# Patient Record
Sex: FEMALE | Race: WHITE | NOT HISPANIC OR LATINO | Employment: FULL TIME | URBAN - METROPOLITAN AREA
[De-identification: names, ages, dates, MRNs, and addresses within clinical notes are randomized per-mention and may not be internally consistent; named-entity substitution may affect disease eponyms.]

---

## 2024-02-02 ENCOUNTER — TELEPHONE (OUTPATIENT)
Dept: PRIMARY CARE | Facility: CLINIC | Age: 59
End: 2024-02-02
Payer: COMMERCIAL

## 2024-02-02 NOTE — TELEPHONE ENCOUNTER
Pt called and said she was in the ER recently with a severe UTI, while there they noticed her WBC count was very elevated and they want Dr Ruby to monitor this, she mentioned she is on bactrum now

## 2024-02-13 ENCOUNTER — TELEPHONE (OUTPATIENT)
Dept: PRIMARY CARE | Facility: CLINIC | Age: 59
End: 2024-02-13
Payer: COMMERCIAL

## 2024-02-13 NOTE — TELEPHONE ENCOUNTER
Pt left message that she was recently in the hospital and prescribed antibiotic for UTI.  States antibiotic is causing GI issues and would like to know if Activia is recommended?

## 2024-02-14 PROBLEM — K52.9 GASTROENTERITIS: Status: ACTIVE | Noted: 2024-02-14

## 2024-02-14 RX ORDER — CETIRIZINE HYDROCHLORIDE, PSEUDOEPHEDRINE HYDROCHLORIDE 5; 120 MG/1; MG/1
1 TABLET, FILM COATED, EXTENDED RELEASE ORAL 2 TIMES DAILY PRN
COMMUNITY
Start: 2019-06-16

## 2024-02-14 RX ORDER — ONDANSETRON 4 MG/1
4 TABLET, ORALLY DISINTEGRATING ORAL EVERY 8 HOURS PRN
COMMUNITY
Start: 2022-10-03 | End: 2024-02-15 | Stop reason: WASHOUT

## 2024-02-15 ENCOUNTER — OFFICE VISIT (OUTPATIENT)
Dept: PRIMARY CARE | Facility: CLINIC | Age: 59
End: 2024-02-15
Payer: COMMERCIAL

## 2024-02-15 ENCOUNTER — LAB (OUTPATIENT)
Dept: LAB | Facility: LAB | Age: 59
End: 2024-02-15
Payer: COMMERCIAL

## 2024-02-15 VITALS
OXYGEN SATURATION: 99 % | HEART RATE: 77 BPM | BODY MASS INDEX: 20.17 KG/M2 | SYSTOLIC BLOOD PRESSURE: 150 MMHG | WEIGHT: 109.6 LBS | TEMPERATURE: 98.7 F | HEIGHT: 62 IN | DIASTOLIC BLOOD PRESSURE: 82 MMHG

## 2024-02-15 DIAGNOSIS — N20.0 RENAL CALCULI: ICD-10-CM

## 2024-02-15 DIAGNOSIS — R39.9 URINARY TRACT INFECTION SYMPTOMS: Primary | ICD-10-CM

## 2024-02-15 DIAGNOSIS — R39.9 URINARY TRACT INFECTION SYMPTOMS: ICD-10-CM

## 2024-02-15 DIAGNOSIS — R94.5 ABNORMAL LIVER FUNCTION: ICD-10-CM

## 2024-02-15 DIAGNOSIS — R74.8 ALKALINE PHOSPHATASE ELEVATION: Primary | ICD-10-CM

## 2024-02-15 LAB
ALBUMIN SERPL BCP-MCNC: 4.7 G/DL (ref 3.4–5)
ALP SERPL-CCNC: 158 U/L (ref 33–136)
ALT SERPL W P-5'-P-CCNC: 29 U/L (ref 7–45)
ANION GAP SERPL CALC-SCNC: 14 MMOL/L (ref 10–20)
APPEARANCE UR: CLEAR
AST SERPL W P-5'-P-CCNC: 23 U/L (ref 9–39)
BACTERIA #/AREA URNS AUTO: ABNORMAL /HPF
BASOPHILS # BLD AUTO: 0.05 X10*3/UL (ref 0–0.1)
BASOPHILS NFR BLD AUTO: 0.7 %
BILIRUB SERPL-MCNC: 0.7 MG/DL (ref 0–1.2)
BILIRUB UR STRIP.AUTO-MCNC: NEGATIVE MG/DL
BUN SERPL-MCNC: 13 MG/DL (ref 6–23)
CALCIUM SERPL-MCNC: 10.4 MG/DL (ref 8.6–10.3)
CHLORIDE SERPL-SCNC: 101 MMOL/L (ref 98–107)
CO2 SERPL-SCNC: 28 MMOL/L (ref 21–32)
COLOR UR: YELLOW
CREAT SERPL-MCNC: 0.66 MG/DL (ref 0.5–1.05)
EGFRCR SERPLBLD CKD-EPI 2021: >90 ML/MIN/1.73M*2
EOSINOPHIL # BLD AUTO: 0.06 X10*3/UL (ref 0–0.7)
EOSINOPHIL NFR BLD AUTO: 0.8 %
ERYTHROCYTE [DISTWIDTH] IN BLOOD BY AUTOMATED COUNT: 13.1 % (ref 11.5–14.5)
GLUCOSE SERPL-MCNC: 89 MG/DL (ref 74–99)
GLUCOSE UR STRIP.AUTO-MCNC: NEGATIVE MG/DL
HCT VFR BLD AUTO: 44.4 % (ref 36–46)
HGB BLD-MCNC: 14 G/DL (ref 12–16)
IMM GRANULOCYTES # BLD AUTO: 0.04 X10*3/UL (ref 0–0.7)
IMM GRANULOCYTES NFR BLD AUTO: 0.5 % (ref 0–0.9)
KETONES UR STRIP.AUTO-MCNC: NEGATIVE MG/DL
LEUKOCYTE ESTERASE UR QL STRIP.AUTO: ABNORMAL
LYMPHOCYTES # BLD AUTO: 1.58 X10*3/UL (ref 1.2–4.8)
LYMPHOCYTES NFR BLD AUTO: 21.3 %
MCH RBC QN AUTO: 28.7 PG (ref 26–34)
MCHC RBC AUTO-ENTMCNC: 31.5 G/DL (ref 32–36)
MCV RBC AUTO: 91 FL (ref 80–100)
MONOCYTES # BLD AUTO: 0.81 X10*3/UL (ref 0.1–1)
MONOCYTES NFR BLD AUTO: 10.9 %
MUCOUS THREADS #/AREA URNS AUTO: ABNORMAL /LPF
NEUTROPHILS # BLD AUTO: 4.89 X10*3/UL (ref 1.2–7.7)
NEUTROPHILS NFR BLD AUTO: 65.8 %
NITRITE UR QL STRIP.AUTO: NEGATIVE
NRBC BLD-RTO: 0 /100 WBCS (ref 0–0)
PH UR STRIP.AUTO: 6 [PH]
PLATELET # BLD AUTO: 500 X10*3/UL (ref 150–450)
POTASSIUM SERPL-SCNC: 4.8 MMOL/L (ref 3.5–5.3)
PROT SERPL-MCNC: 8.1 G/DL (ref 6.4–8.2)
PROT UR STRIP.AUTO-MCNC: NEGATIVE MG/DL
RBC # BLD AUTO: 4.88 X10*6/UL (ref 4–5.2)
RBC # UR STRIP.AUTO: ABNORMAL /UL
RBC #/AREA URNS AUTO: ABNORMAL /HPF
SODIUM SERPL-SCNC: 138 MMOL/L (ref 136–145)
SP GR UR STRIP.AUTO: 1.02
UROBILINOGEN UR STRIP.AUTO-MCNC: <2 MG/DL
WBC # BLD AUTO: 7.4 X10*3/UL (ref 4.4–11.3)
WBC #/AREA URNS AUTO: ABNORMAL /HPF

## 2024-02-15 PROCEDURE — 80053 COMPREHEN METABOLIC PANEL: CPT

## 2024-02-15 PROCEDURE — 85025 COMPLETE CBC W/AUTO DIFF WBC: CPT

## 2024-02-15 PROCEDURE — 84080 ASSAY ALKALINE PHOSPHATASES: CPT

## 2024-02-15 PROCEDURE — 1036F TOBACCO NON-USER: CPT | Performed by: INTERNAL MEDICINE

## 2024-02-15 PROCEDURE — 81001 URINALYSIS AUTO W/SCOPE: CPT

## 2024-02-15 PROCEDURE — 36415 COLL VENOUS BLD VENIPUNCTURE: CPT

## 2024-02-15 PROCEDURE — 87086 URINE CULTURE/COLONY COUNT: CPT

## 2024-02-15 PROCEDURE — 99214 OFFICE O/P EST MOD 30 MIN: CPT | Performed by: INTERNAL MEDICINE

## 2024-02-15 RX ORDER — NITROFURANTOIN MACROCRYSTALS 50 MG/1
50 CAPSULE ORAL NIGHTLY
COMMUNITY
Start: 2024-02-13 | End: 2024-04-22 | Stop reason: WASHOUT

## 2024-02-15 ASSESSMENT — PATIENT HEALTH QUESTIONNAIRE - PHQ9
2. FEELING DOWN, DEPRESSED OR HOPELESS: NOT AT ALL
SUM OF ALL RESPONSES TO PHQ9 QUESTIONS 1 & 2: 0
1. LITTLE INTEREST OR PLEASURE IN DOING THINGS: NOT AT ALL

## 2024-02-15 ASSESSMENT — PAIN SCALES - GENERAL: PAINLEVEL: 0-NO PAIN

## 2024-02-15 NOTE — PROGRESS NOTES
"Standard Progress Note  Chief Complaint   Patient presents with    Hospital Follow-up     Pt of Dr. Ruby here for FUV from discharge from [facility] on 2/5/24.     Accompanied by her  Mikey  HPI:  Ilia Hu 59 y.o.   female.  Patient of Dr. Ruby.  Presents for evaluation post hospitalization.  She states that she is typically in good health.  In December she had a sinus infection and a urinary tract infection.  January 30 she states that she was evaluated by a physician assistant for dysuria but workup was negative.  However January 31, 2023 she had shaking she went to [facility] emergency room treated with Bactrim.  However she states that she developed abnormal liver enzymes.  Patient decayed she was told that this was from the Bactrim.  On February 4 she was called to come into the hospital because she had positive blood cultures.  She was evaluated by an infectious disease doctor.  She thinks it may have been Dr. Polo but not she is not sure the doctor's name.  Patient indicates she was told that the positive blood culture was not really a blood infection.  She was sent home on Keflex 500 mg every 6 hours for 5 days.  Patient indicates she saw the physician assistant on February 13 and testing in the office was negative.  However it sounds like either a dipstick or UA was done but not a culture.  Of note she does have 2 kidney stones as well.    Patient states that she does not feel well.  She describes that her tongue was prickly but when she took the Macrodantin last night that sensation resolved.  She has a burning sensation in the rectal area.  She described it as an acid.  She did feel like she was sweating last night but denies fever.  She feels weak and in general feels unwell.    She recently changed jobs and is working at [employer].  She states aggravating her hard time.          Blood pressure 150/82, pulse 77, temperature 37.1 °C (98.7 °F), height 1.575 m (5' 2\"), weight 49.7 kg (109 lb 9.6 oz), " SpO2 99 %.  Body mass index is 20.05 kg/m².  Appears tired.  She does not appear in acute distress.  Vital reviewed  Neck: no cervical/clavicular adenopathy  CV: RRR S1 S2 normal. No murmur. No carotid bruit.   Lungs: CTA without wrr. Breath sounds symmetric  Abdomen: normoactive. Soft, nontender.  Nontender to deep palpation including suprapubic.  No CVA tenderness no mass.  Extremities: no pretibial edema  Neuro: speech intact.     Reviewed x-ray 5 mm stone right kidney.  3 mm left kidney.  This was done by urologist    1. Urinary tract infection symptoms  She continues to feel unwell.  Recommend obtain UA including culture.  Also recheck CBC and CMP.  Advised her to continue nitrofurantoin for prophylaxis.  Advised her to remain off work until seen by her regular doctor next week.  Discussed with staff at  and patient provided a letter.  - Urine Culture; Future  - Urinalysis with Reflex Microscopic; Future    2. Renal calculi  She is seeing urologist.    3. Abnormal liver function  Unclear etiology recommend repeat as should improve or resolve if was just reaction to her acute illness or medication  - Comprehensive Metabolic Panel; Future  - CBC and Auto Differential; Future      Current Outpatient Medications on File Prior to Visit   Medication Sig Dispense Refill    cetirizine-pseudoephedrine (ZyrTEC-D) 5-120 mg 12 hr tablet Take 1 tablet by mouth 2 times a day as needed.      nitrofurantoin (Macrodantin) 50 mg capsule Take 1 capsule (50 mg) by mouth once daily at bedtime.      [DISCONTINUED] ondansetron ODT (Zofran-ODT) 4 mg disintegrating tablet Take 1 tablet (4 mg) by mouth every 8 hours if needed.       No current facility-administered medications on file prior to visit.         Nelida Bach MD

## 2024-02-15 NOTE — LETTER
February 15, 2024     Patient: Ilia Hu   YOB: 1965   Date of Visit: 2/15/2024       To Whom It May Concern:    Ilia Hu was seen in my clinic on 2/15/2024 at 11:30 am. Please excuse Ilia for her absence from work on this day to make the appointment. She was advised to remain off work until she sees her regular doctor, Dr Ruby. She will see him next week. Appointment is being scheduled.     If you have any questions or concerns, please don't hesitate to call.         Sincerely,         Nelida Bach MD        CC: No Recipients

## 2024-02-16 ENCOUNTER — TELEPHONE (OUTPATIENT)
Dept: PRIMARY CARE | Facility: CLINIC | Age: 59
End: 2024-02-16
Payer: COMMERCIAL

## 2024-02-16 LAB — BACTERIA UR CULT: NORMAL

## 2024-02-16 NOTE — TELEPHONE ENCOUNTER
"Pt left message \"I saw my results online and saw that my platelets are elevated.  I am concerned about this and would like to know if someone can call me and explain this to me?\"  "

## 2024-02-16 NOTE — TELEPHONE ENCOUNTER
----- Message from Nelida Hope MD sent at 2/15/2024  6:13 PM EST -----  Please contact [facility] and obtain records from patient's recent admission including discharge summary evaluation by ID doctor  And any imaging done

## 2024-02-20 ENCOUNTER — OFFICE VISIT (OUTPATIENT)
Dept: PRIMARY CARE | Facility: CLINIC | Age: 59
End: 2024-02-20
Payer: COMMERCIAL

## 2024-02-20 VITALS
SYSTOLIC BLOOD PRESSURE: 132 MMHG | HEIGHT: 62 IN | WEIGHT: 109 LBS | OXYGEN SATURATION: 100 % | TEMPERATURE: 98.5 F | BODY MASS INDEX: 20.06 KG/M2 | DIASTOLIC BLOOD PRESSURE: 88 MMHG | HEART RATE: 89 BPM

## 2024-02-20 DIAGNOSIS — K52.9 GASTROENTERITIS: ICD-10-CM

## 2024-02-20 DIAGNOSIS — R11.0 NAUSEA: Primary | ICD-10-CM

## 2024-02-20 LAB
ALP BONE SERPL-CCNC: 50 U/L (ref 0–55)
ALP LIVER SERPL-CCNC: 137 U/L (ref 0–94)
ALP OTHER SERPL-CCNC: 0 U/L
ALP SERPL-CCNC: 187 U/L (ref 40–120)

## 2024-02-20 PROCEDURE — 1036F TOBACCO NON-USER: CPT | Performed by: FAMILY MEDICINE

## 2024-02-20 PROCEDURE — 99213 OFFICE O/P EST LOW 20 MIN: CPT | Performed by: FAMILY MEDICINE

## 2024-02-20 RX ORDER — ONDANSETRON 4 MG/1
4 TABLET, ORALLY DISINTEGRATING ORAL EVERY 8 HOURS PRN
Qty: 30 TABLET | Refills: 1 | Status: SHIPPED | OUTPATIENT
Start: 2024-02-20 | End: 2024-03-11

## 2024-02-20 RX ORDER — HYOSCYAMINE SULFATE 0.12 MG/1
0.12 TABLET, ORALLY DISINTEGRATING ORAL EVERY 4 HOURS PRN
Qty: 120 EACH | Refills: 0 | Status: SHIPPED | OUTPATIENT
Start: 2024-02-20 | End: 2024-04-22 | Stop reason: WASHOUT

## 2024-02-20 RX ORDER — FAMOTIDINE 40 MG/1
40 TABLET, FILM COATED ORAL 2 TIMES DAILY
Qty: 60 TABLET | Refills: 0 | Status: SHIPPED | OUTPATIENT
Start: 2024-02-20 | End: 2024-04-22 | Stop reason: SDUPTHER

## 2024-02-20 NOTE — LETTER
February 20, 2024     Patient: Ilia Hu   YOB: 1965   Date of Visit: 2/20/2024       To Whom It May Concern:    Ilia Hu was seen in my clinic on 2/20/2024 at 11:15 am. Please excuse Ilia for her absence from work on this day to make the appointment. Due to medical illness and recent hospitalization can return 2/26/2024    If you have any questions or concerns, please don't hesitate to call.         Sincerely,         Brody Ruby, DO        CC: No Recipients

## 2024-02-20 NOTE — PROGRESS NOTES
Patient is here for follow-up where she had been in the hospital for a possible UTI with possible sepsis.  Infectious disease saw her and thought that she had a false positive blood culture.  She is now been on Macrobid use not felt back to herself the main thing is is that she has felt kind of bloated and still fatigued.  She did have 1 good stool today.  She had a reaction to sulfa drugs when she was in the hospital and forgot she was allergic to them.  She is also been kind of nauseated and that does not feel good.  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  Has been drinking water and been peeing more because of that she is not having the same symptoms with the urine issues and did follow-up with the PA for urology.    REVIEW OF SYSTEMS: 12 systems negative except for those mentioned in the HPI.    PHYSICAL EXAMINATION:   Constitutional: The patient is alert, in no acute  distress.  Patient does look a little down  Eyes: Extraocular movements are intact. Pupils are equal and reactive to light  ENT: TMs are clear  Neck: Supple without lymphadenopathy or JVD.  Heart: Regular rate and rhythm without murmur, click, gallop, or rub.  Lungs: Clear to auscultation bilaterally. No rales, rhonchi, or  wheezing.  Psychiatric: Good judgment and insight. Normal affect and mood.  Lymphatic: as noted above.  Skin: no rashes or lesions  Abdomen: Soft, distended, normal bowel sounds.  No pain with palpation or rebound      Assessment:  per EMR    Plan:  I did review the urine culture as well as my partner in the office visit from 5 days ago.  She is on a low-dose of Macrobid nightly.  She does feel like after she takes that in the morning she feels little bit better.  Did recommend following with the urologist.  Patient is quietly distended and likely because of the issues with the  antibiotics as well as the urine issues I believe is having some acid reflux as well as some substantial bloating causing a lot of her symptoms.  Patient also may have some acute adjustment depression she does not seem satisfied with her job nor does she want to go back to it because it sounds like they have had a hard time with her medical illnesses here recently.  If patient has a fever would definitely send back for urine culture blood culture and full blood panels.  Would expected to be rapid resolution within the next day or 2 with the abdomen as well as also discussed the patient needs to get up and actually walk and that it is not uncommon to be fatigued for a few days after but this seems to be a little bit extended    This dictation was created using Dragon dictation and may contain errors

## 2024-02-20 NOTE — PROGRESS NOTES
Fuv debby Hope   Pt said kidney stones again, nausea, not feeling good, soft stool, tongue and throat feels scratchy - x end of January   Urine came back negative the other day   Wants a nausea med   Work letter

## 2024-02-21 ENCOUNTER — TELEPHONE (OUTPATIENT)
Dept: PRIMARY CARE | Facility: CLINIC | Age: 59
End: 2024-02-21
Payer: COMMERCIAL

## 2024-02-21 DIAGNOSIS — R74.8 ELEVATED LIVER ENZYMES: Primary | ICD-10-CM

## 2024-02-21 DIAGNOSIS — K52.9 GASTROENTERITIS: ICD-10-CM

## 2024-02-22 ENCOUNTER — LAB (OUTPATIENT)
Dept: LAB | Facility: LAB | Age: 59
End: 2024-02-22
Payer: COMMERCIAL

## 2024-02-22 DIAGNOSIS — R74.8 ELEVATED LIVER ENZYMES: ICD-10-CM

## 2024-02-22 DIAGNOSIS — K52.9 GASTROENTERITIS: ICD-10-CM

## 2024-02-22 LAB
ALBUMIN SERPL BCP-MCNC: 4.5 G/DL (ref 3.4–5)
ALP SERPL-CCNC: 112 U/L (ref 33–136)
ALT SERPL W P-5'-P-CCNC: 19 U/L (ref 7–45)
AMYLASE SERPL-CCNC: 68 U/L (ref 29–103)
ANION GAP SERPL CALC-SCNC: 13 MMOL/L (ref 10–20)
AST SERPL W P-5'-P-CCNC: 18 U/L (ref 9–39)
BASOPHILS # BLD AUTO: 0.04 X10*3/UL (ref 0–0.1)
BASOPHILS NFR BLD AUTO: 0.5 %
BILIRUB SERPL-MCNC: 0.6 MG/DL (ref 0–1.2)
BUN SERPL-MCNC: 17 MG/DL (ref 6–23)
CALCIUM SERPL-MCNC: 10 MG/DL (ref 8.6–10.3)
CHLORIDE SERPL-SCNC: 99 MMOL/L (ref 98–107)
CMV IGG AVIDITY SERPL IA-RTO: NONREACTIVE %
CO2 SERPL-SCNC: 30 MMOL/L (ref 21–32)
CREAT SERPL-MCNC: 0.92 MG/DL (ref 0.5–1.05)
EBV EA IGG SER QL: POSITIVE
EBV NA AB SER QL: POSITIVE
EBV VCA IGG SER IA-ACNC: POSITIVE
EBV VCA IGM SER IA-ACNC: ABNORMAL
EGFRCR SERPLBLD CKD-EPI 2021: 71 ML/MIN/1.73M*2
EOSINOPHIL # BLD AUTO: 0.06 X10*3/UL (ref 0–0.7)
EOSINOPHIL NFR BLD AUTO: 0.8 %
ERYTHROCYTE [DISTWIDTH] IN BLOOD BY AUTOMATED COUNT: 13.3 % (ref 11.5–14.5)
GLUCOSE SERPL-MCNC: 100 MG/DL (ref 74–99)
HAV IGM SER QL: NONREACTIVE
HBV CORE IGM SER QL: NONREACTIVE
HBV SURFACE AG SERPL QL IA: NONREACTIVE
HCT VFR BLD AUTO: 42.5 % (ref 36–46)
HCV AB SER QL: NONREACTIVE
HGB BLD-MCNC: 13.8 G/DL (ref 12–16)
IMM GRANULOCYTES # BLD AUTO: 0.04 X10*3/UL (ref 0–0.7)
IMM GRANULOCYTES NFR BLD AUTO: 0.5 % (ref 0–0.9)
LIPASE SERPL-CCNC: 46 U/L (ref 9–82)
LYMPHOCYTES # BLD AUTO: 1.79 X10*3/UL (ref 1.2–4.8)
LYMPHOCYTES NFR BLD AUTO: 23 %
MCH RBC QN AUTO: 29.6 PG (ref 26–34)
MCHC RBC AUTO-ENTMCNC: 32.5 G/DL (ref 32–36)
MCV RBC AUTO: 91 FL (ref 80–100)
MONOCYTES # BLD AUTO: 0.82 X10*3/UL (ref 0.1–1)
MONOCYTES NFR BLD AUTO: 10.5 %
NEUTROPHILS # BLD AUTO: 5.04 X10*3/UL (ref 1.2–7.7)
NEUTROPHILS NFR BLD AUTO: 64.7 %
NRBC BLD-RTO: 0 /100 WBCS (ref 0–0)
PLATELET # BLD AUTO: 403 X10*3/UL (ref 150–450)
POTASSIUM SERPL-SCNC: 4.6 MMOL/L (ref 3.5–5.3)
PROT SERPL-MCNC: 7.2 G/DL (ref 6.4–8.2)
RBC # BLD AUTO: 4.66 X10*6/UL (ref 4–5.2)
SODIUM SERPL-SCNC: 137 MMOL/L (ref 136–145)
WBC # BLD AUTO: 7.8 X10*3/UL (ref 4.4–11.3)

## 2024-02-22 PROCEDURE — 36415 COLL VENOUS BLD VENIPUNCTURE: CPT

## 2024-02-22 PROCEDURE — 85025 COMPLETE CBC W/AUTO DIFF WBC: CPT

## 2024-02-22 PROCEDURE — 86665 EPSTEIN-BARR CAPSID VCA: CPT

## 2024-02-22 PROCEDURE — 86644 CMV ANTIBODY: CPT

## 2024-02-22 PROCEDURE — 86664 EPSTEIN-BARR NUCLEAR ANTIGEN: CPT

## 2024-02-22 PROCEDURE — 83690 ASSAY OF LIPASE: CPT

## 2024-02-22 PROCEDURE — 80053 COMPREHEN METABOLIC PANEL: CPT

## 2024-02-22 PROCEDURE — 86645 CMV ANTIBODY IGM: CPT

## 2024-02-22 PROCEDURE — 80074 ACUTE HEPATITIS PANEL: CPT

## 2024-02-22 PROCEDURE — 86663 EPSTEIN-BARR ANTIBODY: CPT

## 2024-02-22 PROCEDURE — 82150 ASSAY OF AMYLASE: CPT

## 2024-02-24 LAB
CMV IGM SERPL-ACNC: <8 AU/ML
EBV VCA IGM SER-ACNC: <10 U/ML (ref 0–43.9)

## 2024-03-15 ENCOUNTER — TELEPHONE (OUTPATIENT)
Dept: PRIMARY CARE | Facility: CLINIC | Age: 59
End: 2024-03-15
Payer: COMMERCIAL

## 2024-03-18 DIAGNOSIS — K14.6 TONGUE BURNING SENSATION: Primary | ICD-10-CM

## 2024-04-16 DIAGNOSIS — R19.7 DIARRHEA OF PRESUMED INFECTIOUS ORIGIN: ICD-10-CM

## 2024-04-16 DIAGNOSIS — F41.9 ANXIETY: ICD-10-CM

## 2024-04-16 DIAGNOSIS — K52.9 GASTROENTERITIS: Primary | ICD-10-CM

## 2024-04-17 ENCOUNTER — LAB (OUTPATIENT)
Dept: LAB | Facility: LAB | Age: 59
End: 2024-04-17
Payer: COMMERCIAL

## 2024-04-17 ENCOUNTER — TELEPHONE (OUTPATIENT)
Dept: PRIMARY CARE | Facility: CLINIC | Age: 59
End: 2024-04-17

## 2024-04-17 DIAGNOSIS — K52.9 GASTROENTERITIS: ICD-10-CM

## 2024-04-17 DIAGNOSIS — R19.7 DIARRHEA OF PRESUMED INFECTIOUS ORIGIN: ICD-10-CM

## 2024-04-17 LAB

## 2024-04-17 PROCEDURE — 87506 IADNA-DNA/RNA PROBE TQ 6-11: CPT

## 2024-04-17 PROCEDURE — 87449 NOS EACH ORGANISM AG IA: CPT

## 2024-04-17 PROCEDURE — 83993 ASSAY FOR CALPROTECTIN FECAL: CPT

## 2024-04-18 LAB — H PYLORI AG STL QL IA: NEGATIVE

## 2024-04-19 LAB — CALPROTECTIN STL-MCNT: 46 UG/G

## 2024-04-22 ENCOUNTER — OFFICE VISIT (OUTPATIENT)
Dept: PRIMARY CARE | Facility: CLINIC | Age: 59
End: 2024-04-22
Payer: COMMERCIAL

## 2024-04-22 VITALS
DIASTOLIC BLOOD PRESSURE: 90 MMHG | TEMPERATURE: 98 F | OXYGEN SATURATION: 100 % | WEIGHT: 105 LBS | BODY MASS INDEX: 19.32 KG/M2 | SYSTOLIC BLOOD PRESSURE: 136 MMHG | HEART RATE: 83 BPM | HEIGHT: 62 IN

## 2024-04-22 DIAGNOSIS — F41.9 ANXIETY: ICD-10-CM

## 2024-04-22 DIAGNOSIS — R35.0 URINE FREQUENCY: ICD-10-CM

## 2024-04-22 DIAGNOSIS — K52.9 GASTROENTERITIS: ICD-10-CM

## 2024-04-22 DIAGNOSIS — R11.0 NAUSEA: ICD-10-CM

## 2024-04-22 DIAGNOSIS — F51.01 PRIMARY INSOMNIA: ICD-10-CM

## 2024-04-22 DIAGNOSIS — R19.5 CHANGE IN CONSISTENCY OF STOOL: ICD-10-CM

## 2024-04-22 DIAGNOSIS — K21.00 GASTROESOPHAGEAL REFLUX DISEASE WITH ESOPHAGITIS WITHOUT HEMORRHAGE: Primary | ICD-10-CM

## 2024-04-22 PROCEDURE — 99214 OFFICE O/P EST MOD 30 MIN: CPT | Performed by: FAMILY MEDICINE

## 2024-04-22 PROCEDURE — 87086 URINE CULTURE/COLONY COUNT: CPT

## 2024-04-22 PROCEDURE — 1036F TOBACCO NON-USER: CPT | Performed by: FAMILY MEDICINE

## 2024-04-22 RX ORDER — PANTOPRAZOLE SODIUM 40 MG/1
40 TABLET, DELAYED RELEASE ORAL DAILY
COMMUNITY
Start: 2024-03-26 | End: 2024-04-22 | Stop reason: WASHOUT

## 2024-04-22 RX ORDER — FAMOTIDINE 40 MG/1
40 TABLET, FILM COATED ORAL 2 TIMES DAILY
Qty: 60 TABLET | Refills: 0 | Status: SHIPPED | OUTPATIENT
Start: 2024-04-22 | End: 2024-05-22

## 2024-04-22 RX ORDER — ONDANSETRON 4 MG/1
4 TABLET, ORALLY DISINTEGRATING ORAL EVERY 8 HOURS PRN
COMMUNITY
Start: 2024-03-26

## 2024-04-22 RX ORDER — SUCRALFATE 1 G/1
1 TABLET ORAL
Qty: 20 TABLET | Refills: 0 | Status: SHIPPED | OUTPATIENT
Start: 2024-04-22 | End: 2024-04-27

## 2024-04-22 RX ORDER — NORTRIPTYLINE HYDROCHLORIDE 10 MG/1
20 CAPSULE ORAL NIGHTLY
Qty: 60 CAPSULE | Refills: 0 | Status: SHIPPED | OUTPATIENT
Start: 2024-04-22 | End: 2024-05-22

## 2024-04-22 ASSESSMENT — PATIENT HEALTH QUESTIONNAIRE - PHQ9
2. FEELING DOWN, DEPRESSED OR HOPELESS: NOT AT ALL
SUM OF ALL RESPONSES TO PHQ9 QUESTIONS 1 AND 2: 0
1. LITTLE INTEREST OR PLEASURE IN DOING THINGS: NOT AT ALL

## 2024-04-22 NOTE — PROGRESS NOTES
Pain, burning in vagina area and anus - x few days   Saw urologist - did not seem concerned, last week, checked for UTI and came back clean   Thinks it could be from acid reflux - mid march  Tongue burning sensation - march   Not eating much bc of acid reflux, afraid   Did a stool sample last week, wants to know about parasites, feels like something is in her stool, can see what looks like white pieces of rice - pt has a picture - x few weeks   Anxiety

## 2024-04-22 NOTE — PROGRESS NOTES
Patient is here with her  for multiple issues.  She been having a bunch of different issues with burning urination has had culture that was done last week at the [facility] that was normal.  She is not really having too many of the issues but she feels like more of a pinching she would like to go ahead and get a sample.  She did have upper and lower scope with sliding hiatal hernia as well as also Joe's esophagitis.  She has not been taking the Pepcid and did not know if it made her feel little bit weird or not.  She also been having issues where she has been very anxious with her health and has been waking up.  She woke up at 12:00 this morning and has not been able to go back to sleep because of it.    REVIEW OF SYSTEMS: 12 systems negative except for those mentioned in the HPI.    PHYSICAL EXAMINATION:   Constitutional: The patient is alert, in no acute  distress.  Eyes: Extraocular movements are intact.   ENT: external ear canals patent  Neck: no  JVD.  Heart: no JVD  Lungs: Normal respiration without stridor or nasal flaring   Psychiatric: Good judgment and insight. Normal affect and mood.  Skin: no rashes or lesions    Assessment:  per EMR    Plan:  Patient will go ahead and have a urine culture at her request.  I believe this is combination reviewing the GI doctor joon and from [facility] reviewed and has the large sliding hiatal hernia as well as the Joe's per her on Pepcid as well as Carafate.  I am also will try nortriptyline she prefers something for the sleep though I prefer to put the patient on 5 or 10 mg of Lexapro which her  was in agreement the patient wishes to wait on that.  Will also she has a picture of some white things in it in the future stool.  Will go ahead and do an ova and parasite of the rest of her PCR and testing your stool was negative we did not do that.  Would also consider putting her on Lexapro as discussed    This dictation was created using  Dragon dictation and may contain errors

## 2024-04-23 ENCOUNTER — LAB (OUTPATIENT)
Dept: LAB | Facility: LAB | Age: 59
End: 2024-04-23
Payer: COMMERCIAL

## 2024-04-23 DIAGNOSIS — R19.5 CHANGE IN CONSISTENCY OF STOOL: ICD-10-CM

## 2024-04-23 LAB — BACTERIA UR CULT: NORMAL

## 2024-04-23 PROCEDURE — 87329 GIARDIA AG IA: CPT

## 2024-04-23 PROCEDURE — 87328 CRYPTOSPORIDIUM AG IA: CPT

## 2024-04-23 RX ORDER — ESCITALOPRAM OXALATE 10 MG/1
10 TABLET ORAL DAILY
Qty: 30 TABLET | Refills: 5 | Status: SHIPPED | OUTPATIENT
Start: 2024-04-23 | End: 2024-05-09 | Stop reason: ALTCHOICE

## 2024-04-25 ENCOUNTER — TELEPHONE (OUTPATIENT)
Dept: PRIMARY CARE | Facility: CLINIC | Age: 59
End: 2024-04-25
Payer: COMMERCIAL

## 2024-04-25 DIAGNOSIS — R35.0 URINE FREQUENCY: Primary | ICD-10-CM

## 2024-04-25 DIAGNOSIS — Z12.31 ENCOUNTER FOR SCREENING MAMMOGRAM FOR BREAST CANCER: ICD-10-CM

## 2024-04-25 LAB — O+P STL MICRO: NEGATIVE

## 2024-04-25 RX ORDER — FLUCONAZOLE 150 MG/1
150 TABLET ORAL ONCE
Qty: 1 TABLET | Refills: 0 | Status: SHIPPED | OUTPATIENT
Start: 2024-04-25 | End: 2024-04-25

## 2024-04-25 RX ORDER — NITROFURANTOIN 25; 75 MG/1; MG/1
100 CAPSULE ORAL 2 TIMES DAILY
Qty: 14 CAPSULE | Refills: 0 | Status: SHIPPED | OUTPATIENT
Start: 2024-04-25 | End: 2024-05-09 | Stop reason: WASHOUT

## 2024-04-25 NOTE — TELEPHONE ENCOUNTER
Patient called she is still not feeling well and would like a atb sent to [facility] and is worried about a incident like last time.

## 2024-04-26 LAB
CRYPTOSP AG STL QL IA: NEGATIVE
G LAMBLIA AG STL QL IA: NEGATIVE

## 2024-04-30 ENCOUNTER — TELEPHONE (OUTPATIENT)
Dept: PRIMARY CARE | Facility: CLINIC | Age: 59
End: 2024-04-30
Payer: COMMERCIAL

## 2024-05-01 ENCOUNTER — HOSPITAL ENCOUNTER (OUTPATIENT)
Dept: RADIOLOGY | Facility: CLINIC | Age: 59
Discharge: HOME | End: 2024-05-01
Payer: COMMERCIAL

## 2024-05-01 VITALS — BODY MASS INDEX: 18.77 KG/M2 | WEIGHT: 102 LBS | HEIGHT: 62 IN

## 2024-05-01 DIAGNOSIS — Z12.31 ENCOUNTER FOR SCREENING MAMMOGRAM FOR BREAST CANCER: ICD-10-CM

## 2024-05-01 PROCEDURE — 77063 BREAST TOMOSYNTHESIS BI: CPT | Performed by: RADIOLOGY

## 2024-05-01 PROCEDURE — 77067 SCR MAMMO BI INCL CAD: CPT | Performed by: RADIOLOGY

## 2024-05-01 PROCEDURE — 77067 SCR MAMMO BI INCL CAD: CPT

## 2024-05-08 PROBLEM — K59.00 CONSTIPATION: Status: ACTIVE | Noted: 2024-04-02

## 2024-05-08 PROBLEM — H57.89 EYE DISCHARGE: Status: ACTIVE | Noted: 2024-05-08

## 2024-05-08 PROBLEM — R19.5 CHANGE IN CONSISTENCY OF STOOL: Status: ACTIVE | Noted: 2024-04-23

## 2024-05-08 PROBLEM — N76.0 ACUTE VAGINITIS: Status: ACTIVE | Noted: 2024-05-08

## 2024-05-08 PROBLEM — K52.9 COLITIS: Status: ACTIVE | Noted: 2024-05-08

## 2024-05-08 PROBLEM — J01.90 ACUTE SINUSITIS: Status: ACTIVE | Noted: 2024-05-08

## 2024-05-08 PROBLEM — N20.0 CALCULUS OF KIDNEY: Status: ACTIVE | Noted: 2018-09-26

## 2024-05-08 PROBLEM — E87.6 HYPOKALEMIA: Status: ACTIVE | Noted: 2024-05-08

## 2024-05-08 PROBLEM — F41.8 MIXED ANXIETY AND DEPRESSIVE DISORDER: Status: ACTIVE | Noted: 2024-05-08

## 2024-05-08 PROBLEM — K64.4 EXTERNAL HEMORRHOID: Status: ACTIVE | Noted: 2024-05-08

## 2024-05-08 PROBLEM — N39.0 CHRONIC URINARY TRACT INFECTION: Status: ACTIVE | Noted: 2024-05-08

## 2024-05-08 PROBLEM — R31.29 MICROSCOPIC HEMATURIA: Status: ACTIVE | Noted: 2018-09-06

## 2024-05-08 PROBLEM — R39.9 UTI SYMPTOMS: Status: ACTIVE | Noted: 2024-02-15

## 2024-05-08 PROBLEM — L71.9 ROSACEA: Status: ACTIVE | Noted: 2024-05-08

## 2024-05-08 PROBLEM — G47.00 INSOMNIA: Status: ACTIVE | Noted: 2024-04-22

## 2024-05-08 PROBLEM — J06.9 VIRAL URI: Status: ACTIVE | Noted: 2024-05-08

## 2024-05-08 PROBLEM — K22.70 BARRETT'S ESOPHAGUS: Status: ACTIVE | Noted: 2024-04-02

## 2024-05-08 PROBLEM — R30.0 DYSURIA: Status: ACTIVE | Noted: 2018-09-06

## 2024-05-08 PROBLEM — J02.9 SORE THROAT: Status: ACTIVE | Noted: 2024-05-08

## 2024-05-08 PROBLEM — K21.9 GERD (GASTROESOPHAGEAL REFLUX DISEASE): Status: ACTIVE | Noted: 2024-04-02

## 2024-05-08 PROBLEM — B37.0 CANDIDIASIS OF MOUTH: Status: ACTIVE | Noted: 2024-05-08

## 2024-05-08 PROBLEM — K12.0 APHTHOUS ULCER OF TONGUE: Status: ACTIVE | Noted: 2024-05-08

## 2024-05-08 PROBLEM — N30.01 ACUTE CYSTITIS WITH HEMATURIA: Status: ACTIVE | Noted: 2024-05-08

## 2024-05-08 PROBLEM — K76.89 HEPATIC DYSFUNCTION: Status: ACTIVE | Noted: 2024-02-15

## 2024-05-08 PROBLEM — K63.5 COLON POLYPS: Status: ACTIVE | Noted: 2024-04-02

## 2024-05-08 PROBLEM — R03.0 ELEVATED BLOOD PRESSURE READING: Status: ACTIVE | Noted: 2024-05-08

## 2024-05-08 PROBLEM — R10.9 ABDOMINAL PAIN: Status: ACTIVE | Noted: 2024-05-08

## 2024-05-09 ENCOUNTER — OFFICE VISIT (OUTPATIENT)
Dept: PRIMARY CARE | Facility: CLINIC | Age: 59
End: 2024-05-09
Payer: COMMERCIAL

## 2024-05-09 VITALS
HEART RATE: 92 BPM | HEIGHT: 62 IN | SYSTOLIC BLOOD PRESSURE: 128 MMHG | BODY MASS INDEX: 18.95 KG/M2 | DIASTOLIC BLOOD PRESSURE: 86 MMHG | TEMPERATURE: 97.8 F | OXYGEN SATURATION: 97 % | WEIGHT: 103 LBS

## 2024-05-09 DIAGNOSIS — F41.8 MIXED ANXIETY AND DEPRESSIVE DISORDER: ICD-10-CM

## 2024-05-09 DIAGNOSIS — K21.9 GASTROESOPHAGEAL REFLUX DISEASE WITHOUT ESOPHAGITIS: ICD-10-CM

## 2024-05-09 DIAGNOSIS — J30.1 SEASONAL ALLERGIC RHINITIS DUE TO POLLEN: ICD-10-CM

## 2024-05-09 DIAGNOSIS — F41.9 ANXIETY: Primary | ICD-10-CM

## 2024-05-09 DIAGNOSIS — R11.0 NAUSEA: ICD-10-CM

## 2024-05-09 PROCEDURE — 1036F TOBACCO NON-USER: CPT | Performed by: FAMILY MEDICINE

## 2024-05-09 PROCEDURE — 99214 OFFICE O/P EST MOD 30 MIN: CPT | Performed by: FAMILY MEDICINE

## 2024-05-09 RX ORDER — ESCITALOPRAM OXALATE 5 MG/1
5 TABLET ORAL DAILY
Qty: 90 TABLET | Refills: 0 | Status: SHIPPED | OUTPATIENT
Start: 2024-05-09 | End: 2024-08-07

## 2024-05-09 RX ORDER — PANTOPRAZOLE SODIUM 40 MG/1
40 TABLET, DELAYED RELEASE ORAL DAILY
Qty: 30 TABLET | Refills: 1 | Status: SHIPPED | OUTPATIENT
Start: 2024-05-09 | End: 2024-07-08

## 2024-05-09 ASSESSMENT — PATIENT HEALTH QUESTIONNAIRE - PHQ9
1. LITTLE INTEREST OR PLEASURE IN DOING THINGS: NOT AT ALL
SUM OF ALL RESPONSES TO PHQ9 QUESTIONS 1 AND 2: 0
2. FEELING DOWN, DEPRESSED OR HOPELESS: NOT AT ALL

## 2024-05-09 NOTE — PROGRESS NOTES
Patient is here 2-week follow-up.  Patient is doing much better resolution of the UTI symptoms.  She is also doing much better on the 5 mg of Lexapro and finds herself not screaming.  They had an issue with the youngest daughter where it has been very hard and she has been very resistant with both mom and dad.  Patient also been having some worsening of her acid reflux still on the famotidine.  She had a cheeseburger 3 hours later was still little bit upset also she has had some puffiness underneath the eyes     REVIEW OF SYSTEMS: 12 systems negative except for those mentioned in the HPI.    PHYSICAL EXAMINATION:   Constitutional: The patient is alert, in no acute  distress.  Eyes: Extraocular movements are intact.   ENT: external ear canals patent  Neck: no  JVD.  Heart: no JVD  Lungs: Normal respiration without stridor or nasal flaring   Psychiatric: Good judgment and insight. Normal affect and mood.  Skin: no rashes or lesions    Assessment:  per EMR    Plan:  Patient doing much better continue at 5 mg of the Lexapro follow-up in 6 months.patient with worsening acid reflux so we will go ahead and increase the pantoprazole for at least 2 to 4 weeks.  Will also increase antihistamine 1 twice daily.  Also help with the puffiness.    This dictation was created using Dragon dictation and may contain errors

## 2024-05-31 PROBLEM — Z71.3 DIETARY COUNSELING AND SURVEILLANCE: Status: ACTIVE | Noted: 2024-05-31

## 2024-05-31 NOTE — PROGRESS NOTES
Reason for Nutrition Visit:  Pt is a 59 y.o. female being seen in [place] referred for   1. Gastroesophageal reflux disease with esophagitis without hemorrhage  Referral to Nutrition Services      2. Gastroenteritis  Referral to Nutrition Services      3. Dietary counseling and surveillance           Past Medical Hx:  Patient Active Problem List   Diagnosis    Gastroenteritis    Nausea    Elevated liver enzymes    Gastroesophageal reflux disease with esophagitis    Anxiety    Urine frequency    Insomnia    Abdominal pain    Acute cystitis with hematuria    Acute sinusitis    Acute vaginitis    Aphthous ulcer of tongue    Joe's esophagus    Calculus of kidney    Candidiasis of mouth    Change in consistency of stool    Chronic urinary tract infection    Colitis    Colon polyps    Dysuria    Elevated blood pressure reading    External hemorrhoid    Eye discharge    GERD (gastroesophageal reflux disease)    Hepatic dysfunction    Hypokalemia    Microscopic hematuria    Mixed anxiety and depressive disorder    Rosacea    Sore throat    UTI symptoms    Viral URI    Constipation    Seasonal allergic rhinitis due to pollen    Dietary counseling and surveillance      Nutrition Assessment    Food & Nutrition Related History:  Patient is here to discuss diet for gastrointestinal issues. She has already been educated about MNT for GERD and has been avoiding spicy foods, tomato, caffeine, chocolate, and she has been making lower fat food choices. She added Activia yogurt to her diet. She attributes GI symptoms to starting new medications.   She reads about diet online and has been seeing conflicting information about what to do for GI issues. Her  Kwabena Charles) accompanied her to the appointment.     Food: Allergies: None  Intolerance: spicy foods, caffeine, chocolate.  Appetite: Good  GI Symptoms : has bloating, feels like she has to have diarrhea, but doesn't. BM today was hard pellets. Tends to be  "constipated throughout her life. Two days ago stool was long/soft. Occasionally stools are loose, more when she was on meds.    Swallowing Difficulty: No problems with swallowing  Chewing no problems chewing  Food Preparation: Patient  Cooking Skills/Barriers: None reported  Grocery Shopping: Patient  No difficulty affording food  Supplements: Denies   Sleep duration/quality : reports she has been more tired.     Dietary Recall:   Meal 1: breakfast  - 2 slices toast w/ butter and jelly, 8-10 ounces coffee with cream  - or cereal  - or bagels  - used to eat pastry but cut that out  Meal 2:   - handful or more of Wheat Thins, sometimes grazes instead of eating lunch  - or sometimes goes out for lunch, I.e. went to [employer] yesterday, ate most of a sandwich but couldn't finish the large portion.   - sometimes a turkey wrap  Meal 3: she aims not to eat after 6  - spaghetti  - sometimes cereal for dinner with 1% milk on it  Usually no evening snacking     Beverages: water, cut out pop/green tea/iced tea when her symptoms started. 1 mug coffee in the morning  Eating out: 3-4 per month  Alcohol Intake: never    Physical Activity: used to walk many steps working at [employer]. Now more sedentary, about 2,000 steps per day.     Labs:  Lab Results   Component Value Date     02/22/2024    K 4.6 02/22/2024    CL 99 02/22/2024    CO2 30 02/22/2024    BUN 17 02/22/2024    CREATININE 0.92 02/22/2024    CALCIUM 10.0 02/22/2024    ALBUMIN 4.5 02/22/2024    PROT 7.2 02/22/2024    BILITOT 0.6 02/22/2024    ALKPHOS 112 02/22/2024    ALT 19 02/22/2024    AST 18 02/22/2024    GLUCOSE 100 (H) 02/22/2024     No results found for: \"CHOL\", \"LDLCALC\", \"TRIG\", \"HDL\", \"LDLF\"     Nutrition Focused Physical Exam:    Performed/Deferred: Performed     Muscle Wasting:  Temporalis: Well nourished (well-defined muscle)  Pectoralis (Clavicular Region): Well nourished (clavicle not visible)  Deltoid/Trapezius: Mild-Moderate (slight " "protrusion of acromion process)  Interosseous: Well nourished (muscle bulges)  Quadriceps: Mild-Moderate (mild depression on inner and outer thigh)    Loss of Subcutaneous Fat:  Orbital Fat Pads: Well nourished (slightly bulging fat pads)  Buccal Fat Pads: Well nourished (full, rounded cheeks)  Triceps: Well nourished (ample fat tissue)  Ribs: Well nourished (chest is full, ribs do not show, slight to no protrusion of the iliac crest)    Anthropometrics:  Ht Readings from Last 1 Encounters:   06/04/24 1.575 m (5' 2\")     BMI Readings from Last 1 Encounters:   06/04/24 18.84 kg/m²     Wt Readings from Last 10 Encounters:   05/09/24 46.7 kg (103 lb)   05/01/24 46.3 kg (102 lb)   04/22/24 47.6 kg (105 lb)   02/20/24 49.4 kg (109 lb)   02/15/24 49.7 kg (109 lb 9.6 oz)   02/27/22 49.9 kg (110 lb)   02/16/22 49.9 kg (110 lb)   11/30/21 49.4 kg (109 lb)   04/02/21 49.4 kg (109 lb)   03/05/21 49.9 kg (110 lb)     Weight change: stable within 10# in the past 3 years, but weight is below the ideal range considering her height/age (BMI 18.8).   She reports UBW was 110# and that she has lost weight. Battery was dead in the scale today, didn't obtain a weight.  Significant weight change: No    Estimated Nutrition Needs:    Total Energy Estimated Needs (kCal): 1482 kCal   Method for Estimating Needs: REE x 1.2 + 300 for weight gain   Total Protein Estimated Needs (g): 46.82 g   Total Protein Estimated Needs (g/kg): 1 g/kg    Nutrition Diagnosis     Patient has Malnutrition Diagnosis: No        Patient has Nutrition Diagnosis: Yes Diagnosis Status (1): New  Nutrition Diagnosis 1: Altered GI function Related to (1): likely mixed etiology - GERD, gastroenteritis, other issues As Evidenced by (1): patient reports irregular stools, bloating, feeling like she has to have diarrhea     Nutrition Interventions/Recommendations   Nutrition education on the following diet topic(s): Decreased Fat, Increased Fiber, and low " lactose    Coordination of Care: None    Education:  Reviewed nutrition therapy for GERD, of which she was already familiar with. Advised that fruits and vegetables should be avoided only if she has symptoms, such as if she can tolerate some fresh tomato she should eat it, but she might have more problem tolerating tomato sauce. Also citrus fruit might be tolerated. Advised limiting/avoiding caffeine, greasy/high fat foods, peppermint/spearmint, chocolate, and continuing to avoid alcohol.   Advised her on a low lactose diet to try and notice if her symptoms are improved. Provided her information with food sources of lactose and discussed ways to avoid/limit lactose.   It seems that her diet may not contain adequate fiber, and fiber intake may not be consistent. Advised gradually increasing fiber intake from fruit, vegetables, whole grains, nuts, seeds and beans. Provided her with menus for examples of how to gradually increase fiber, encouraged her to set goals each day to consume multiple foods that are rich in fiber.     Handouts: NCM GERD, NCM low lactose, lactose content of foods, menus for fiber, high fiber food list, and 14 ways to integrate more fiber    *Patient expressed understanding of the education provided and denied any additional questions/concerns.     Monitoring & Evaluation:  Monitoring & Evaluation: Estimated Energy Intake, Amount of Food - Estimated, Types of Food, Meal/Snack Pattern - Estimated, and Estimated Fiber Intake, GI symptoms    Nutrition Goals:  She will increase intake of fiber-rich foods in order to meet daily intake of about 20-25 grams fiber.   She will limit lactose for a week or two and observe whether a low lactose diet improves her symptoms.   She will continue to avoid foods that might trigger GERD symptoms.       Follow up Plan:   PRN, patient will call to schedule follow up if desired    Readiness to Change : Good  Level of Understanding : Good  Anticipated Compliant : Good

## 2024-06-03 ENCOUNTER — NUTRITION (OUTPATIENT)
Dept: NUTRITION | Facility: CLINIC | Age: 59
End: 2024-06-03
Payer: COMMERCIAL

## 2024-06-03 DIAGNOSIS — K21.00 GASTROESOPHAGEAL REFLUX DISEASE WITH ESOPHAGITIS WITHOUT HEMORRHAGE: Primary | ICD-10-CM

## 2024-06-03 DIAGNOSIS — K52.9 GASTROENTERITIS: ICD-10-CM

## 2024-06-03 DIAGNOSIS — Z71.3 DIETARY COUNSELING AND SURVEILLANCE: ICD-10-CM

## 2024-06-03 PROCEDURE — 97802 MEDICAL NUTRITION INDIV IN: CPT | Performed by: DIETITIAN, REGISTERED

## 2024-06-04 VITALS — BODY MASS INDEX: 18.84 KG/M2 | HEIGHT: 62 IN

## 2024-11-19 ENCOUNTER — APPOINTMENT (OUTPATIENT)
Dept: PRIMARY CARE | Facility: CLINIC | Age: 59
End: 2024-11-19

## 2024-11-19 DIAGNOSIS — R39.9 UTI SYMPTOMS: Primary | ICD-10-CM

## 2024-11-19 RX ORDER — NITROFURANTOIN 25; 75 MG/1; MG/1
100 CAPSULE ORAL 2 TIMES DAILY
Qty: 14 CAPSULE | Refills: 0 | Status: SHIPPED | OUTPATIENT
Start: 2024-11-19 | End: 2024-11-26

## 2024-11-25 ENCOUNTER — TELEPHONE (OUTPATIENT)
Dept: CARDIOLOGY | Facility: CLINIC | Age: 59
End: 2024-11-25

## 2025-01-02 ENCOUNTER — APPOINTMENT (OUTPATIENT)
Dept: PRIMARY CARE | Facility: CLINIC | Age: 60
End: 2025-01-02
Payer: COMMERCIAL

## 2025-01-02 ENCOUNTER — LAB (OUTPATIENT)
Dept: LAB | Facility: LAB | Age: 60
End: 2025-01-02
Payer: COMMERCIAL

## 2025-01-02 VITALS
TEMPERATURE: 98 F | HEIGHT: 62 IN | DIASTOLIC BLOOD PRESSURE: 80 MMHG | WEIGHT: 104 LBS | HEART RATE: 84 BPM | BODY MASS INDEX: 19.14 KG/M2 | SYSTOLIC BLOOD PRESSURE: 132 MMHG

## 2025-01-02 DIAGNOSIS — F41.8 MIXED ANXIETY AND DEPRESSIVE DISORDER: ICD-10-CM

## 2025-01-02 DIAGNOSIS — R61 NIGHT SWEATS: ICD-10-CM

## 2025-01-02 DIAGNOSIS — R30.0 DYSURIA: ICD-10-CM

## 2025-01-02 DIAGNOSIS — N39.0 CHRONIC URINARY TRACT INFECTION: ICD-10-CM

## 2025-01-02 DIAGNOSIS — Z00.00 WELL ADULT EXAM: Primary | ICD-10-CM

## 2025-01-02 DIAGNOSIS — L71.9 ROSACEA: ICD-10-CM

## 2025-01-02 DIAGNOSIS — F51.01 PRIMARY INSOMNIA: ICD-10-CM

## 2025-01-02 DIAGNOSIS — Z00.00 WELL ADULT EXAM: ICD-10-CM

## 2025-01-02 DIAGNOSIS — K22.70 BARRETT'S ESOPHAGUS WITHOUT DYSPLASIA: ICD-10-CM

## 2025-01-02 LAB
25(OH)D3 SERPL-MCNC: 21 NG/ML (ref 30–100)
ALBUMIN SERPL BCP-MCNC: 4.5 G/DL (ref 3.4–5)
ALP SERPL-CCNC: 76 U/L (ref 33–136)
ALT SERPL W P-5'-P-CCNC: 21 U/L (ref 7–45)
ANION GAP SERPL CALC-SCNC: 11 MMOL/L (ref 10–20)
AST SERPL W P-5'-P-CCNC: 20 U/L (ref 9–39)
BASOPHILS # BLD AUTO: 0.05 X10*3/UL (ref 0–0.1)
BASOPHILS NFR BLD AUTO: 0.7 %
BILIRUB SERPL-MCNC: 0.5 MG/DL (ref 0–1.2)
BUN SERPL-MCNC: 13 MG/DL (ref 6–23)
CALCIUM SERPL-MCNC: 10.1 MG/DL (ref 8.6–10.3)
CHLORIDE SERPL-SCNC: 104 MMOL/L (ref 98–107)
CHOLEST SERPL-MCNC: 213 MG/DL (ref 0–199)
CHOLESTEROL/HDL RATIO: 2.9
CMV IGG AVIDITY SERPL IA-RTO: NONREACTIVE %
CO2 SERPL-SCNC: 30 MMOL/L (ref 21–32)
CREAT SERPL-MCNC: 0.74 MG/DL (ref 0.5–1.05)
EBV EA IGG SER QL: POSITIVE
EBV NA AB SER QL: POSITIVE
EBV VCA IGG SER IA-ACNC: POSITIVE
EBV VCA IGM SER IA-ACNC: NEGATIVE
EGFRCR SERPLBLD CKD-EPI 2021: >90 ML/MIN/1.73M*2
EOSINOPHIL # BLD AUTO: 0.04 X10*3/UL (ref 0–0.7)
EOSINOPHIL NFR BLD AUTO: 0.6 %
ERYTHROCYTE [DISTWIDTH] IN BLOOD BY AUTOMATED COUNT: 13.2 % (ref 11.5–14.5)
EST. AVERAGE GLUCOSE BLD GHB EST-MCNC: 94 MG/DL
ESTRADIOL SERPL-MCNC: <19 PG/ML
FSH SERPL-ACNC: 58.4 IU/L
GLUCOSE SERPL-MCNC: 129 MG/DL (ref 74–99)
HBA1C MFR BLD: 4.9 %
HCT VFR BLD AUTO: 42.3 % (ref 36–46)
HDLC SERPL-MCNC: 72.6 MG/DL
HGB BLD-MCNC: 13.8 G/DL (ref 12–16)
IMM GRANULOCYTES # BLD AUTO: 0.02 X10*3/UL (ref 0–0.7)
IMM GRANULOCYTES NFR BLD AUTO: 0.3 % (ref 0–0.9)
IRON SATN MFR SERPL: 27 % (ref 25–45)
IRON SERPL-MCNC: 106 UG/DL (ref 35–150)
LDLC SERPL CALC-MCNC: 111 MG/DL
LH SERPL-ACNC: 41.9 IU/L
LYMPHOCYTES # BLD AUTO: 1.52 X10*3/UL (ref 1.2–4.8)
LYMPHOCYTES NFR BLD AUTO: 21.5 %
MCH RBC QN AUTO: 29.6 PG (ref 26–34)
MCHC RBC AUTO-ENTMCNC: 32.6 G/DL (ref 32–36)
MCV RBC AUTO: 91 FL (ref 80–100)
MONOCYTES # BLD AUTO: 0.45 X10*3/UL (ref 0.1–1)
MONOCYTES NFR BLD AUTO: 6.4 %
NEUTROPHILS # BLD AUTO: 5 X10*3/UL (ref 1.2–7.7)
NEUTROPHILS NFR BLD AUTO: 70.5 %
NON HDL CHOLESTEROL: 140 MG/DL (ref 0–149)
NRBC BLD-RTO: 0 /100 WBCS (ref 0–0)
PLATELET # BLD AUTO: 347 X10*3/UL (ref 150–450)
POTASSIUM SERPL-SCNC: 4.2 MMOL/L (ref 3.5–5.3)
PROT SERPL-MCNC: 7.2 G/DL (ref 6.4–8.2)
RBC # BLD AUTO: 4.67 X10*6/UL (ref 4–5.2)
SODIUM SERPL-SCNC: 141 MMOL/L (ref 136–145)
TIBC SERPL-MCNC: 387 UG/DL (ref 240–445)
TRIGL SERPL-MCNC: 147 MG/DL (ref 0–149)
TSH SERPL-ACNC: 1.62 MIU/L (ref 0.44–3.98)
UIBC SERPL-MCNC: 281 UG/DL (ref 110–370)
VLDL: 29 MG/DL (ref 0–40)
WBC # BLD AUTO: 7.1 X10*3/UL (ref 4.4–11.3)

## 2025-01-02 PROCEDURE — 82670 ASSAY OF TOTAL ESTRADIOL: CPT

## 2025-01-02 PROCEDURE — 82306 VITAMIN D 25 HYDROXY: CPT

## 2025-01-02 PROCEDURE — 83002 ASSAY OF GONADOTROPIN (LH): CPT

## 2025-01-02 PROCEDURE — 3008F BODY MASS INDEX DOCD: CPT | Performed by: FAMILY MEDICINE

## 2025-01-02 PROCEDURE — 80061 LIPID PANEL: CPT

## 2025-01-02 PROCEDURE — 83550 IRON BINDING TEST: CPT

## 2025-01-02 PROCEDURE — 80053 COMPREHEN METABOLIC PANEL: CPT

## 2025-01-02 PROCEDURE — 85025 COMPLETE CBC W/AUTO DIFF WBC: CPT

## 2025-01-02 PROCEDURE — 1036F TOBACCO NON-USER: CPT | Performed by: FAMILY MEDICINE

## 2025-01-02 PROCEDURE — 84443 ASSAY THYROID STIM HORMONE: CPT

## 2025-01-02 PROCEDURE — 83540 ASSAY OF IRON: CPT

## 2025-01-02 PROCEDURE — 86664 EPSTEIN-BARR NUCLEAR ANTIGEN: CPT

## 2025-01-02 PROCEDURE — 86665 EPSTEIN-BARR CAPSID VCA: CPT

## 2025-01-02 PROCEDURE — 86644 CMV ANTIBODY: CPT

## 2025-01-02 PROCEDURE — 83036 HEMOGLOBIN GLYCOSYLATED A1C: CPT

## 2025-01-02 PROCEDURE — 99396 PREV VISIT EST AGE 40-64: CPT | Performed by: FAMILY MEDICINE

## 2025-01-02 PROCEDURE — 86663 EPSTEIN-BARR ANTIBODY: CPT

## 2025-01-02 PROCEDURE — 83001 ASSAY OF GONADOTROPIN (FSH): CPT

## 2025-01-02 RX ORDER — DULOXETIN HYDROCHLORIDE 30 MG/1
30 CAPSULE, DELAYED RELEASE ORAL DAILY
Qty: 30 CAPSULE | Refills: 2 | Status: SHIPPED | OUTPATIENT
Start: 2025-01-02 | End: 2025-04-02

## 2025-01-02 RX ORDER — ESTRADIOL 0.1 MG/G
CREAM VAGINAL
COMMUNITY
Start: 2024-11-20

## 2025-01-02 RX ORDER — BISMUTH SUBSALICYLATE 262 MG
1 TABLET,CHEWABLE ORAL DAILY
COMMUNITY

## 2025-01-02 NOTE — PROGRESS NOTES
Patient is here for any wellness multitude of things.  She did have upper and lower scope that was done about this time last year and otherwise doing great.  She has had a dry vaginal condition started on Estrace does not really feel like that made much improvement is back to OB to see about that.  Also has had a lot of urinary frequency however patient also is still drinking heavy water all the way up even until the nighttime.   is present.  Also said a hard time getting out of bed.  She took Lexapro but really did not feel like I really did too much.    REVIEW OF SYSTEMS: 12 systems negative except for those mentioned in the HPI. Reviewed home risks with patient. Patient feels safe at home.    PHYSICAL EXAMINATION:   Constitutional: The patient is alert and oriented x3, in no acute  distress.  Eyes: Extraocular movements are intact. Pupils are equal and reactive to light  ENT: Bilateral TMs within normal limits. Nasal turbinates are within normal limits. Throat within normal limits.  Neck: Supple without lymphadenopathy or JVD.  Heart: Regular rate and rhythm without murmur, click, gallop, or rub.  Lungs: Clear to auscultation bilaterally. No rales, rhonchi, or  wheezing.  Abdomen: Soft, nontender, nondistended. Normoactive bowel sounds.   No palpable masses. Normal percussion  Musculoskeletal: 5/5 motor, upper and lower extremities.  Neurologic: Cranial nerves II through XII fully intact. +2/4 DTRs  in ankle and knee.  Psychiatric: Good judgment and insight. Normal affect and mood. No cognitive defects noted.  Lymphatic: Negative as noted above.  Skin: no rashes or lesions    Assessment:  Per EMR    Plan:  Patient will have annual blood work.  Discussed probiotic vaginal douche as well as also follow-up with urology.  Can also see GYN a urogecology.  Will also go ahead and do hormone testing as well.  Also patient with depression and discussed holding water within 3 hours of going to bed as well as also  Cymbalta  Discussed with the patient and reviewed annual wellness questionnaire form as well as cognitive deficits. Also discussed with the patient immunizations and risks for colonoscopy and other screening procedures    This dictation was created using Dragon dictation and may contain errors

## 2025-01-02 NOTE — PROGRESS NOTES
Awv   Seen in urgent care in nov, increased urinary freq, soreness - treated, no infection  still urinating frequently and having lower back pain, pt said she's also been drinking a lot of water   Night sweats - x few weeks   Lips are very dry pt says   Nausea - x few weeks, on and off   Heartburn, scratchy throat

## 2025-01-03 DIAGNOSIS — B27.90 EBV INFECTION: Primary | ICD-10-CM

## 2025-01-03 RX ORDER — VALACYCLOVIR HYDROCHLORIDE 1 G/1
1000 TABLET, FILM COATED ORAL 3 TIMES DAILY
Qty: 21 TABLET | Refills: 0 | Status: SHIPPED | OUTPATIENT
Start: 2025-01-03 | End: 2025-01-10

## 2025-01-08 ENCOUNTER — TELEPHONE (OUTPATIENT)
Dept: PRIMARY CARE | Facility: CLINIC | Age: 60
End: 2025-01-08
Payer: COMMERCIAL

## 2025-01-30 DIAGNOSIS — K52.9 GASTROENTERITIS: ICD-10-CM

## 2025-01-30 DIAGNOSIS — R11.0 NAUSEA: ICD-10-CM

## 2025-01-30 RX ORDER — FAMOTIDINE 40 MG/1
40 TABLET, FILM COATED ORAL 2 TIMES DAILY
Qty: 180 TABLET | Refills: 1 | Status: SHIPPED | OUTPATIENT
Start: 2025-01-30 | End: 2025-07-29

## 2025-06-09 LAB — NON-UH HIE GP HPV: NEGATIVE

## 2025-06-10 LAB — NON-UH HIE THINPREP TIS PAP: NORMAL

## 2026-01-02 ENCOUNTER — APPOINTMENT (OUTPATIENT)
Dept: PRIMARY CARE | Facility: CLINIC | Age: 61
End: 2026-01-02
Payer: COMMERCIAL